# Patient Record
Sex: MALE | Race: WHITE | NOT HISPANIC OR LATINO | Employment: UNEMPLOYED | URBAN - METROPOLITAN AREA
[De-identification: names, ages, dates, MRNs, and addresses within clinical notes are randomized per-mention and may not be internally consistent; named-entity substitution may affect disease eponyms.]

---

## 2022-04-26 ENCOUNTER — OFFICE VISIT (OUTPATIENT)
Dept: FAMILY MEDICINE CLINIC | Facility: CLINIC | Age: 52
End: 2022-04-26

## 2022-04-26 VITALS
SYSTOLIC BLOOD PRESSURE: 154 MMHG | HEART RATE: 76 BPM | HEIGHT: 74 IN | WEIGHT: 233 LBS | DIASTOLIC BLOOD PRESSURE: 90 MMHG | TEMPERATURE: 97.1 F | RESPIRATION RATE: 14 BRPM | OXYGEN SATURATION: 97 % | BODY MASS INDEX: 29.9 KG/M2

## 2022-04-26 DIAGNOSIS — I25.118 CORONARY ARTERY DISEASE OF NATIVE ARTERY OF NATIVE HEART WITH STABLE ANGINA PECTORIS (HCC): ICD-10-CM

## 2022-04-26 DIAGNOSIS — Z11.59 ENCOUNTER FOR HEPATITIS C SCREENING TEST FOR LOW RISK PATIENT: Primary | ICD-10-CM

## 2022-04-26 DIAGNOSIS — E11.69 HYPERLIPIDEMIA ASSOCIATED WITH TYPE 2 DIABETES MELLITUS (HCC): ICD-10-CM

## 2022-04-26 DIAGNOSIS — K21.9 GASTROESOPHAGEAL REFLUX DISEASE WITHOUT ESOPHAGITIS: ICD-10-CM

## 2022-04-26 DIAGNOSIS — Z12.11 SCREEN FOR COLON CANCER: ICD-10-CM

## 2022-04-26 DIAGNOSIS — F17.210 CIGARETTE SMOKER: ICD-10-CM

## 2022-04-26 DIAGNOSIS — J43.9 PULMONARY EMPHYSEMA, UNSPECIFIED EMPHYSEMA TYPE (HCC): ICD-10-CM

## 2022-04-26 DIAGNOSIS — R07.89 OTHER CHEST PAIN: ICD-10-CM

## 2022-04-26 DIAGNOSIS — R06.02 SOB (SHORTNESS OF BREATH): ICD-10-CM

## 2022-04-26 DIAGNOSIS — E04.1 THYROID NODULE: ICD-10-CM

## 2022-04-26 DIAGNOSIS — E78.5 HYPERLIPIDEMIA ASSOCIATED WITH TYPE 2 DIABETES MELLITUS (HCC): ICD-10-CM

## 2022-04-26 DIAGNOSIS — R73.09 ELEVATED HEMOGLOBIN A1C: ICD-10-CM

## 2022-04-26 DIAGNOSIS — I10 PRIMARY HYPERTENSION: ICD-10-CM

## 2022-04-26 DIAGNOSIS — Z11.4 SCREENING FOR HIV (HUMAN IMMUNODEFICIENCY VIRUS): ICD-10-CM

## 2022-04-26 PROCEDURE — 99203 OFFICE O/P NEW LOW 30 MIN: CPT | Performed by: INTERNAL MEDICINE

## 2022-04-26 PROCEDURE — 93000 ELECTROCARDIOGRAM COMPLETE: CPT | Performed by: INTERNAL MEDICINE

## 2022-04-26 RX ORDER — AMLODIPINE BESYLATE 10 MG/1
10 TABLET ORAL DAILY
Qty: 90 TABLET | Refills: 3 | Status: SHIPPED | OUTPATIENT
Start: 2022-04-26

## 2022-04-26 RX ORDER — CLOPIDOGREL BISULFATE 75 MG/1
75 TABLET ORAL DAILY
Qty: 90 TABLET | Refills: 3 | Status: SHIPPED | OUTPATIENT
Start: 2022-04-26

## 2022-04-26 RX ORDER — CLOPIDOGREL BISULFATE 75 MG/1
75 TABLET ORAL DAILY
COMMUNITY
End: 2022-04-26 | Stop reason: SDUPTHER

## 2022-04-26 RX ORDER — VALSARTAN 160 MG/1
160 TABLET ORAL DAILY
Qty: 90 TABLET | Refills: 3 | Status: SHIPPED | OUTPATIENT
Start: 2022-04-26

## 2022-04-26 RX ORDER — AMLODIPINE BESYLATE AND ATORVASTATIN CALCIUM 10; 40 MG/1; MG/1
1 TABLET, FILM COATED ORAL DAILY
COMMUNITY
End: 2022-04-26

## 2022-04-26 RX ORDER — PANTOPRAZOLE SODIUM 40 MG/1
40 TABLET, DELAYED RELEASE ORAL DAILY
Qty: 30 TABLET | Refills: 3 | Status: SHIPPED | OUTPATIENT
Start: 2022-04-26

## 2022-04-26 RX ORDER — HYDROCHLOROTHIAZIDE 12.5 MG/1
12.5 TABLET ORAL DAILY
Qty: 90 TABLET | Refills: 3 | Status: SHIPPED | OUTPATIENT
Start: 2022-04-26

## 2022-04-26 RX ORDER — ASPIRIN 81 MG/1
81 TABLET ORAL DAILY
COMMUNITY
End: 2022-04-26

## 2022-04-26 RX ORDER — NEBIVOLOL 5 MG/1
5 TABLET ORAL DAILY
Qty: 90 TABLET | Refills: 3 | Status: SHIPPED | OUTPATIENT
Start: 2022-04-26

## 2022-04-26 RX ORDER — ATORVASTATIN CALCIUM 20 MG/1
20 TABLET, FILM COATED ORAL DAILY
Qty: 90 TABLET | Refills: 3 | Status: SHIPPED | OUTPATIENT
Start: 2022-04-26

## 2022-04-26 NOTE — LETTER
April 26, 2022     Patient: Xiomara Cantu  YOB: 1970  Date of Visit: 4/26/2022      To Whom it May Concern:    Xiomara Cantu is under my professional care  Radha Mccord was seen in my office on 4/26/2022  Alena needs medical insurance as soon as possible  This patient has COPD, Hypertension, CAD with two stents, Shortness of Breath, and Ulcer Disease  It is pertinent that this patient gets medical assistance and insurance at this time  If you have any questions or concerns, please don't hesitate to call           Sincerely,          Domenick Blizzard, MD        CC: No Recipients

## 2022-04-26 NOTE — PROGRESS NOTES
Assessment/Plan:         Diagnoses and all orders for this visit:    Encounter for hepatitis C screening test for low risk patient  -     Hepatitis C antibody; Future    Screening for HIV (human immunodeficiency virus)  -     HIV 1/2 Antigen/Antibody (4th Generation) w Reflex [facility]; Future    Screen for colon cancer  -     Occult Blood, Fecal Immunochemical; Future    Coronary artery disease of native artery of native heart with stable angina pectoris (HCC);S/P Stents Twice,;  -     nebivolol (BYSTOLIC) 5 mg tablet; Take 1 tablet (5 mg total) by mouth daily  -     amLODIPine (NORVASC) 10 mg tablet; Take 1 tablet (10 mg total) by mouth daily  -     valsartan (DIOVAN) 160 mg tablet; Take 1 tablet (160 mg total) by mouth daily  -     hydrochlorothiazide (HYDRODIURIL) 12 5 mg tablet; Take 1 tablet (12 5 mg total) by mouth daily  -     clopidogrel (PLAVIX) 75 mg tablet; Take 1 tablet (75 mg total) by mouth daily  -     Vitamin D 25 hydroxy; Future  -     Vitamin B12; Future  -     Stress test only, exercise; Future  -     CT chest wo contrast; Future  RTC in 1 mo w Blood work    Gastroesophageal reflux disease without esophagitis  -     pantoprazole (PROTONIX) 40 mg tablet; Take 1 tablet (40 mg total) by mouth daily  -     Vitamin D 25 hydroxy; Future  -     Vitamin B12; Future  -     H  pylori antigen, stool; Future    Primary hypertension  -     nebivolol (BYSTOLIC) 5 mg tablet; Take 1 tablet (5 mg total) by mouth daily  -     amLODIPine (NORVASC) 10 mg tablet; Take 1 tablet (10 mg total) by mouth daily  -     valsartan (DIOVAN) 160 mg tablet; Take 1 tablet (160 mg total) by mouth daily  -     hydrochlorothiazide (HYDRODIURIL) 12 5 mg tablet; Take 1 tablet (12 5 mg total) by mouth daily  -     Vitamin D 25 hydroxy; Future  -     Vitamin B12; Future  -     Stress test only, exercise;  Future  -     CT chest wo contrast; Future    Hyperlipidemia associated with type 2 diabetes mellitus (HCC)  -     atorvastatin (LIPITOR) 20 mg tablet; Take 1 tablet (20 mg total) by mouth daily  -     Lipid panel; Future  -     Vitamin D 25 hydroxy; Future  -     Vitamin B12; Future  -     Echo complete w/ contrast if indicated; Future  -     Stress test only, exercise; Future  -     CT chest wo contrast; Future    Cigarette smoker  -     Vitamin D 25 hydroxy; Future  -     Vitamin B12; Future  -     Echo complete w/ contrast if indicated; Future  -     Stress test only, exercise; Future  -     CT chest wo contrast; Future  -     Complete PFT with post bronchodilator; Future    Pulmonary emphysema, unspecified emphysema type ([address] )  -     Echo complete w/ contrast if indicated; Future  -     Complete PFT with post bronchodilator; Future    SOB (shortness of breath)  -     Echo complete w/ contrast if indicated; Future  -     Stress test only, exercise; Future  -     CT chest wo contrast; Future  -     POCT ECG    Other chest pain  -     Echo complete w/ contrast if indicated; Future  -     POCT ECG    Elevated hemoglobin A1c  -     UA (URINE) with reflex to Scope; Future  -     Hemoglobin A1C; Future  -     Comprehensive metabolic panel; Future  -     CBC and differential; Future  -     Magnesium; Future  -     Vitamin D 25 hydroxy; Future  -     Vitamin B12; Future  -     Echo complete w/ contrast if indicated; Future    Thyroid nodule  -     TSH, 3rd generation; Future  -     T4, free; Future  -     Thyroid Antibodies Panel; Future  -     US thyroid; Future  -     Vitamin D 25 hydroxy; Future  -     Vitamin B12; Future    Other orders  -     Discontinue: aspirin (ECOTRIN LOW STRENGTH) 81 mg EC tablet; Take 81 mg by mouth daily  -     Discontinue: rivaroxaban (XARELTO) 15 mg tablet; Take 15 mg by mouth  -     Discontinue: clopidogrel (PLAVIX) 75 mg tablet; Take 75 mg by mouth daily  -     Discontinue: amLODIPine-atorvastatin (CADUET) 10-40 MG per tablet; Take 1 tablet by mouth daily        Subjective:      Patient ID: Dari Oneal is a 49 y o  male     49 Y O Man is here for first time , H/P Discussed w Pt in detail, Lately he has increasing exertional SOB, still smokes,  The following portions of the patient's history were reviewed and updated as appropriate: allergies, current medications, past family history, past medical history, past social history, past surgical history and problem list     Review of Systems   Constitutional: Negative for chills, fatigue and fever  HENT: Negative for congestion, facial swelling, sore throat, trouble swallowing and voice change  Eyes: Negative for pain, discharge and visual disturbance  Respiratory: Positive for shortness of breath and wheezing  Negative for cough  Cardiovascular: Positive for chest pain  Negative for palpitations and leg swelling  Gastrointestinal: Negative for abdominal pain, blood in stool, constipation, diarrhea and nausea  Endocrine: Negative for polydipsia, polyphagia and polyuria  Genitourinary: Negative for difficulty urinating, hematuria and urgency  Musculoskeletal: Negative for arthralgias and myalgias  Skin: Negative for rash  Neurological: Negative for dizziness, tremors, weakness and headaches  Hematological: Negative for adenopathy  Does not bruise/bleed easily  Psychiatric/Behavioral: Negative for dysphoric mood, sleep disturbance and suicidal ideas  Objective:      /90 (BP Location: Left arm, Patient Position: Sitting, Cuff Size: Standard)   Pulse 76   Temp (!) 97 1 °F (36 2 °C) (Tympanic)   Resp 14   Ht 6' 2" (1 88 m)   Wt 106 kg (233 lb)   SpO2 97%   BMI 29 92 kg/m²          Physical Exam  Constitutional:       General: He is not in acute distress  HENT:      Head: Normocephalic  Mouth/Throat:      Pharynx: No oropharyngeal exudate  Eyes:      General: No scleral icterus  Conjunctiva/sclera: Conjunctivae normal       Pupils: Pupils are equal, round, and reactive to light  Neck:      Thyroid: No thyromegaly  Cardiovascular:      Rate and Rhythm: Normal rate and regular rhythm  Heart sounds: Murmur heard  Pulmonary:      Effort: Pulmonary effort is normal  No respiratory distress  Breath sounds: Wheezing present  No rales  Abdominal:      General: Bowel sounds are normal  There is no distension  Palpations: Abdomen is soft  Tenderness: There is no abdominal tenderness  There is no guarding or rebound  Musculoskeletal:         General: No tenderness  Cervical back: Neck supple  Lymphadenopathy:      Cervical: No cervical adenopathy  Skin:     Coloration: Skin is not pale  Findings: No rash  Neurological:      Mental Status: He is alert and oriented to person, place, and time  Sensory: No sensory deficit  Motor: No weakness

## 2022-04-26 NOTE — PROGRESS NOTES
BMI Counseling: Body mass index is 29 92 kg/m²  The BMI is above normal  Nutrition recommendations include reducing portion sizes

## 2024-05-08 ENCOUNTER — OFFICE VISIT (OUTPATIENT)
Dept: FAMILY MEDICINE CLINIC | Facility: CLINIC | Age: 54
End: 2024-05-08

## 2024-05-08 VITALS
RESPIRATION RATE: 14 BRPM | WEIGHT: 250 LBS | HEART RATE: 78 BPM | DIASTOLIC BLOOD PRESSURE: 80 MMHG | TEMPERATURE: 97.6 F | SYSTOLIC BLOOD PRESSURE: 130 MMHG | HEIGHT: 73 IN | BODY MASS INDEX: 33.13 KG/M2 | OXYGEN SATURATION: 99 %

## 2024-05-08 DIAGNOSIS — E78.5 HYPERLIPIDEMIA ASSOCIATED WITH TYPE 2 DIABETES MELLITUS  (HCC): ICD-10-CM

## 2024-05-08 DIAGNOSIS — E04.1 THYROID NODULE: ICD-10-CM

## 2024-05-08 DIAGNOSIS — I25.118 CORONARY ARTERY DISEASE OF NATIVE ARTERY OF NATIVE HEART WITH STABLE ANGINA PECTORIS (HCC): ICD-10-CM

## 2024-05-08 DIAGNOSIS — I10 PRIMARY HYPERTENSION: ICD-10-CM

## 2024-05-08 DIAGNOSIS — M79.672 CHRONIC HEEL PAIN, LEFT: ICD-10-CM

## 2024-05-08 DIAGNOSIS — G89.29 CHRONIC HEEL PAIN, LEFT: ICD-10-CM

## 2024-05-08 DIAGNOSIS — Z00.01 ENCOUNTER FOR GENERAL ADULT MEDICAL EXAMINATION WITH ABNORMAL FINDINGS: Primary | ICD-10-CM

## 2024-05-08 DIAGNOSIS — Z12.11 SCREEN FOR COLON CANCER: ICD-10-CM

## 2024-05-08 DIAGNOSIS — E11.69 HYPERLIPIDEMIA ASSOCIATED WITH TYPE 2 DIABETES MELLITUS  (HCC): ICD-10-CM

## 2024-05-08 DIAGNOSIS — K21.9 GASTROESOPHAGEAL REFLUX DISEASE WITHOUT ESOPHAGITIS: ICD-10-CM

## 2024-05-08 DIAGNOSIS — R06.02 SOB (SHORTNESS OF BREATH): ICD-10-CM

## 2024-05-08 LAB — SL AMB POCT HEMOGLOBIN AIC: 5.8 (ref ?–6.5)

## 2024-05-08 PROCEDURE — 99214 OFFICE O/P EST MOD 30 MIN: CPT | Performed by: INTERNAL MEDICINE

## 2024-05-08 PROCEDURE — 93000 ELECTROCARDIOGRAM COMPLETE: CPT | Performed by: INTERNAL MEDICINE

## 2024-05-08 PROCEDURE — 83036 HEMOGLOBIN GLYCOSYLATED A1C: CPT | Performed by: INTERNAL MEDICINE

## 2024-05-08 PROCEDURE — 99396 PREV VISIT EST AGE 40-64: CPT | Performed by: INTERNAL MEDICINE

## 2024-05-08 RX ORDER — AMLODIPINE BESYLATE 10 MG/1
10 TABLET ORAL DAILY
Qty: 90 TABLET | Refills: 3 | Status: SHIPPED | OUTPATIENT
Start: 2024-05-08

## 2024-05-08 RX ORDER — ATORVASTATIN CALCIUM 20 MG/1
20 TABLET, FILM COATED ORAL DAILY
Qty: 90 TABLET | Refills: 3 | Status: SHIPPED | OUTPATIENT
Start: 2024-05-08

## 2024-05-08 RX ORDER — CLOPIDOGREL BISULFATE 75 MG/1
75 TABLET ORAL DAILY
Qty: 90 TABLET | Refills: 3 | Status: SHIPPED | OUTPATIENT
Start: 2024-05-08

## 2024-05-08 RX ORDER — HYDROCHLOROTHIAZIDE 12.5 MG/1
12.5 TABLET ORAL DAILY
Qty: 90 TABLET | Refills: 3 | Status: SHIPPED | OUTPATIENT
Start: 2024-05-08

## 2024-05-08 RX ORDER — VALSARTAN 160 MG/1
160 TABLET ORAL DAILY
Qty: 90 TABLET | Refills: 3 | Status: SHIPPED | OUTPATIENT
Start: 2024-05-08

## 2024-05-08 RX ORDER — NEBIVOLOL 5 MG/1
5 TABLET ORAL DAILY
Qty: 90 TABLET | Refills: 3 | Status: SHIPPED | OUTPATIENT
Start: 2024-05-08

## 2024-05-08 NOTE — LETTER
May 8, 2024     Patient: Alena Kulkarni  YOB: 1970  Date of Visit: 5/8/2024      To Whom it May Concern:    Alena Kulkarni is under my professional care. Alena was seen in my office on 5/8/2024. Alena is in need of medical insurance as soon as possible due to having multiple medical conditions.     If you have any questions or concerns, please don't hesitate to call.         Sincerely,          Jayy Miranda MD        CC: No Recipients

## 2024-05-08 NOTE — PROGRESS NOTES
Name: Alena Kulkarni      : 1970      MRN: [id number]  Encounter Provider: Jayy Miranda MD  Encounter Date: 2024   Encounter department: [facility]    Assessment & Plan     1. Encounter for general adult medical examination with abnormal findings  Comments:  done in Detail  life style Mod  stop smoking  RTC in 1-2 mos w Blood work  Orders:  -     UA (URINE) with reflex to Scope; Future  -     PSA Total, Diagnostic; Future  -     Magnesium; Future  -     Vitamin B12; Future  -     Vitamin D 25 hydroxy; Future; Expected date: 2024  -     CBC and differential; Future; Expected date: 05/15/2024  -     Lipid panel; Future; Expected date: 05/15/2024  -     Comprehensive metabolic panel; Future; Expected date: 2024    2. Coronary artery disease of native artery of native heart with stable angina pectoris (HCC)  Comments:  stable  continue same  stop smoking  RTC in 1-2 mos w Exercise stress test  Orders:  -     amLODIPine (NORVASC) 10 mg tablet; Take 1 tablet (10 mg total) by mouth daily  -     clopidogrel (PLAVIX) 75 mg tablet; Take 1 tablet (75 mg total) by mouth daily  -     hydroCHLOROthiazide 12.5 mg tablet; Take 1 tablet (12.5 mg total) by mouth daily  -     nebivolol (BYSTOLIC) 5 mg tablet; Take 1 tablet (5 mg total) by mouth daily  -     valsartan (DIOVAN) 160 mg tablet; Take 1 tablet (160 mg total) by mouth daily  -     POCT hemoglobin A1c  -     POCT ECG  -     H. pylori antigen, stool; Future  -     Cologuard  -     XR foot 3+ vw left; Future; Expected date: 2024  -     CT chest wo contrast; Future; Expected date: 2024  -     Echo complete w/ contrast if indicated; Future; Expected date: 2024  -     Stress test only, exercise; Future; Expected date: 2024  -     PSA Total, Diagnostic; Future    3. Primary hypertension  -     amLODIPine (NORVASC) 10 mg tablet; Take 1 tablet (10 mg total) by mouth daily  -     hydroCHLOROthiazide  12.5 mg tablet; Take 1 tablet (12.5 mg total) by mouth daily  -     nebivolol (BYSTOLIC) 5 mg tablet; Take 1 tablet (5 mg total) by mouth daily  -     valsartan (DIOVAN) 160 mg tablet; Take 1 tablet (160 mg total) by mouth daily  -     POCT hemoglobin A1c  -     POCT ECG  -     PSA Total, Diagnostic; Future    4. Hyperlipidemia associated with type 2 diabetes mellitus  (HCC)  -     atorvastatin (LIPITOR) 20 mg tablet; Take 1 tablet (20 mg total) by mouth daily  -     POCT hemoglobin A1c  -     POCT ECG  -     PSA Total, Diagnostic; Future    5. Chronic heel pain, left  Comments:  moist Heat  home P .T.  Consider; podiatry consult  Orders:  -     Diclofenac Sodium (VOLTAREN) 1 %; Apply 2 g topically 4 (four) times a day  -     XR foot 3+ vw left; Future; Expected date: 05/08/2024    6. Gastroesophageal reflux disease without esophagitis  -     H. pylori antigen, stool; Future    7. Screen for colon cancer  -     Cologuard    8. SOB (shortness of breath)  -     CT chest wo contrast; Future; Expected date: 05/08/2024  -     Echo complete w/ contrast if indicated; Future; Expected date: 05/08/2024  -     Stress test only, exercise; Future; Expected date: 05/08/2024    9. Thyroid nodule  -     TSH, 3rd generation; Future; Expected date: 05/08/2024  -     T4, free; Future; Expected date: 05/08/2024  -     US thyroid; Future; Expected date: 05/08/2024  -     Thyroid Antibodies Panel; Future    Annual Physical Exam : done in detail  Life style Mod  RTC in 1-2 mos w Blood work       Subjective      53 Y O man is here for Annual Physical Exam and Regular check Up, he still smokes, he has few symptoms, no recent Blood work, med list reviewed,..      Review of Systems   Constitutional:  Negative for chills, fatigue and fever.   HENT:  Negative for congestion, facial swelling, sore throat, trouble swallowing and voice change.    Eyes:  Negative for pain, discharge and visual disturbance.   Respiratory:  Negative for cough,  "shortness of breath and wheezing.    Cardiovascular:  Negative for chest pain, palpitations and leg swelling.   Gastrointestinal:  Negative for abdominal pain, blood in stool, constipation, diarrhea and nausea.   Endocrine: Negative for polydipsia, polyphagia and polyuria.   Genitourinary:  Negative for difficulty urinating, hematuria and urgency.   Musculoskeletal:  Positive for arthralgias and gait problem. Negative for myalgias.   Skin:  Negative for rash.   Neurological:  Negative for dizziness, tremors, weakness and headaches.   Hematological:  Negative for adenopathy. Does not bruise/bleed easily.   Psychiatric/Behavioral:  Negative for dysphoric mood, sleep disturbance and suicidal ideas.        Current Outpatient Medications on File Prior to Visit   Medication Sig    [DISCONTINUED] amLODIPine (NORVASC) 10 mg tablet Take 1 tablet (10 mg total) by mouth daily    [DISCONTINUED] atorvastatin (LIPITOR) 20 mg tablet Take 1 tablet (20 mg total) by mouth daily    [DISCONTINUED] clopidogrel (PLAVIX) 75 mg tablet Take 1 tablet (75 mg total) by mouth daily    [DISCONTINUED] hydrochlorothiazide (HYDRODIURIL) 12.5 mg tablet Take 1 tablet (12.5 mg total) by mouth daily    [DISCONTINUED] nebivolol (BYSTOLIC) 5 mg tablet Take 1 tablet (5 mg total) by mouth daily    [DISCONTINUED] pantoprazole (PROTONIX) 40 mg tablet Take 1 tablet (40 mg total) by mouth daily    [DISCONTINUED] valsartan (DIOVAN) 160 mg tablet Take 1 tablet (160 mg total) by mouth daily       Objective     /80 (BP Location: Left arm, Patient Position: Sitting, Cuff Size: Standard)   Pulse 78   Temp 97.6 °F (36.4 °C) (Tympanic)   Resp 14   Ht 6' 1\" (1.854 m)   Wt 113 kg (250 lb)   SpO2 99%   BMI 32.98 kg/m²     Physical Exam  Constitutional:       General: He is not in acute distress.  HENT:      Head: Normocephalic.      Mouth/Throat:      Pharynx: No oropharyngeal exudate.   Eyes:      General: No scleral icterus.     Conjunctiva/sclera: " Conjunctivae normal.      Pupils: Pupils are equal, round, and reactive to light.   Neck:      Thyroid: No thyromegaly.   Cardiovascular:      Rate and Rhythm: Normal rate and regular rhythm.      Heart sounds: Murmur heard.   Pulmonary:      Effort: Pulmonary effort is normal. No respiratory distress.      Breath sounds: Wheezing present. No rales.   Abdominal:      General: Bowel sounds are normal. There is no distension.      Palpations: Abdomen is soft.      Tenderness: There is no abdominal tenderness. There is no guarding or rebound.   Musculoskeletal:         General: Tenderness present.      Cervical back: Neck supple.      Comments: Left heel area   Lymphadenopathy:      Cervical: No cervical adenopathy.   Skin:     Coloration: Skin is not pale.      Findings: No rash.   Neurological:      Mental Status: He is alert and oriented to person, place, and time.      Sensory: No sensory deficit.      Motor: No weakness.       Jayy Miranda MD

## 2024-09-11 ENCOUNTER — APPOINTMENT (EMERGENCY)
Dept: CT IMAGING | Facility: HOSPITAL | Age: 54
End: 2024-09-11

## 2024-09-11 ENCOUNTER — HOSPITAL ENCOUNTER (EMERGENCY)
Facility: HOSPITAL | Age: 54
Discharge: HOME | End: 2024-09-12
Attending: EMERGENCY MEDICINE

## 2024-09-11 DIAGNOSIS — R91.1 LUNG NODULE: ICD-10-CM

## 2024-09-11 DIAGNOSIS — E04.1 THYROID NODULE: ICD-10-CM

## 2024-09-11 DIAGNOSIS — Z63.5 PROBLEMS ASSOCIATED WITH DIVORCE: ICD-10-CM

## 2024-09-11 DIAGNOSIS — F39 MOOD DISORDER (HCC): ICD-10-CM

## 2024-09-11 DIAGNOSIS — R07.9 CHEST PAIN: ICD-10-CM

## 2024-09-11 DIAGNOSIS — R45.851 SUICIDAL IDEATIONS: Primary | ICD-10-CM

## 2024-09-11 DIAGNOSIS — R93.2 ABNORMAL LIVER CT: ICD-10-CM

## 2024-09-11 LAB
2HR DELTA HS TROPONIN: 1 NG/L
ALBUMIN SERPL BCG-MCNC: 4.9 G/DL (ref 3.5–5)
ALP SERPL-CCNC: 85 U/L (ref 34–104)
ALT SERPL W P-5'-P-CCNC: 22 U/L (ref 7–52)
AMPHETAMINES SERPL QL SCN: NEGATIVE
ANION GAP SERPL CALCULATED.3IONS-SCNC: 10 MMOL/L (ref 4–13)
AST SERPL W P-5'-P-CCNC: 17 U/L (ref 13–39)
ATRIAL RATE: 85 BPM
BARBITURATES UR QL: NEGATIVE
BASOPHILS # BLD AUTO: 0.06 THOUSANDS/ΜL (ref 0–0.1)
BASOPHILS NFR BLD AUTO: 1 % (ref 0–1)
BENZODIAZ UR QL: NEGATIVE
BILIRUB SERPL-MCNC: 0.4 MG/DL (ref 0.2–1)
BNP SERPL-MCNC: 34 PG/ML (ref 0–100)
BUN SERPL-MCNC: 6 MG/DL (ref 5–25)
CALCIUM SERPL-MCNC: 9.9 MG/DL (ref 8.4–10.2)
CARDIAC TROPONIN I PNL SERPL HS: 4 NG/L
CARDIAC TROPONIN I PNL SERPL HS: 5 NG/L
CHLORIDE SERPL-SCNC: 98 MMOL/L (ref 96–108)
CO2 SERPL-SCNC: 28 MMOL/L (ref 21–32)
COCAINE UR QL: NEGATIVE
CREAT SERPL-MCNC: 0.98 MG/DL (ref 0.6–1.3)
EOSINOPHIL # BLD AUTO: 0.12 THOUSAND/ΜL (ref 0–0.61)
EOSINOPHIL NFR BLD AUTO: 1 % (ref 0–6)
ERYTHROCYTE [DISTWIDTH] IN BLOOD BY AUTOMATED COUNT: 14.2 % (ref 11.6–15.1)
ETHANOL EXG-MCNC: 0 MG/DL
ETHANOL SERPL-MCNC: <10 MG/DL
FENTANYL UR QL SCN: NEGATIVE
GFR SERPL CREATININE-BSD FRML MDRD: 87 ML/MIN/1.73SQ M
GLUCOSE SERPL-MCNC: 116 MG/DL (ref 65–140)
HCT VFR BLD AUTO: 44.9 % (ref 36.5–49.3)
HGB BLD-MCNC: 15.3 G/DL (ref 12–17)
HYDROCODONE UR QL SCN: NEGATIVE
IMM GRANULOCYTES # BLD AUTO: 0.05 THOUSAND/UL (ref 0–0.2)
IMM GRANULOCYTES NFR BLD AUTO: 1 % (ref 0–2)
LYMPHOCYTES # BLD AUTO: 2.78 THOUSANDS/ΜL (ref 0.6–4.47)
LYMPHOCYTES NFR BLD AUTO: 28 % (ref 14–44)
MCH RBC QN AUTO: 28.5 PG (ref 26.8–34.3)
MCHC RBC AUTO-ENTMCNC: 34.1 G/DL (ref 31.4–37.4)
MCV RBC AUTO: 84 FL (ref 82–98)
METHADONE UR QL: NEGATIVE
MONOCYTES # BLD AUTO: 0.5 THOUSAND/ΜL (ref 0.17–1.22)
MONOCYTES NFR BLD AUTO: 5 % (ref 4–12)
NEUTROPHILS # BLD AUTO: 6.39 THOUSANDS/ΜL (ref 1.85–7.62)
NEUTS SEG NFR BLD AUTO: 64 % (ref 43–75)
NRBC BLD AUTO-RTO: 0 /100 WBCS
OPIATES UR QL SCN: NEGATIVE
OXYCODONE+OXYMORPHONE UR QL SCN: NEGATIVE
P AXIS: 51 DEGREES
PCP UR QL: NEGATIVE
PLATELET # BLD AUTO: 328 THOUSANDS/UL (ref 149–390)
PMV BLD AUTO: 9.8 FL (ref 8.9–12.7)
POTASSIUM SERPL-SCNC: 3 MMOL/L (ref 3.5–5.3)
PR INTERVAL: 180 MS
PROT SERPL-MCNC: 8 G/DL (ref 6.4–8.4)
QRS AXIS: -23 DEGREES
QRSD INTERVAL: 118 MS
QT INTERVAL: 382 MS
QTC INTERVAL: 454 MS
RBC # BLD AUTO: 5.36 MILLION/UL (ref 3.88–5.62)
SODIUM SERPL-SCNC: 136 MMOL/L (ref 135–147)
T WAVE AXIS: -6 DEGREES
THC UR QL: NEGATIVE
TSH SERPL DL<=0.05 MIU/L-ACNC: 0.84 UIU/ML (ref 0.45–4.5)
VENTRICULAR RATE: 85 BPM
WBC # BLD AUTO: 9.9 THOUSAND/UL (ref 4.31–10.16)

## 2024-09-11 PROCEDURE — 82077 ASSAY SPEC XCP UR&BREATH IA: CPT | Performed by: PHYSICIAN ASSISTANT

## 2024-09-11 PROCEDURE — 82075 ASSAY OF BREATH ETHANOL: CPT | Performed by: PHYSICIAN ASSISTANT

## 2024-09-11 PROCEDURE — 99285 EMERGENCY DEPT VISIT HI MDM: CPT | Performed by: PHYSICIAN ASSISTANT

## 2024-09-11 PROCEDURE — 80307 DRUG TEST PRSMV CHEM ANLYZR: CPT | Performed by: PHYSICIAN ASSISTANT

## 2024-09-11 PROCEDURE — 93010 ELECTROCARDIOGRAM REPORT: CPT | Performed by: INTERNAL MEDICINE

## 2024-09-11 PROCEDURE — 96374 THER/PROPH/DIAG INJ IV PUSH: CPT

## 2024-09-11 PROCEDURE — 71275 CT ANGIOGRAPHY CHEST: CPT

## 2024-09-11 PROCEDURE — 93005 ELECTROCARDIOGRAM TRACING: CPT

## 2024-09-11 PROCEDURE — 83880 ASSAY OF NATRIURETIC PEPTIDE: CPT | Performed by: PHYSICIAN ASSISTANT

## 2024-09-11 PROCEDURE — 74174 CTA ABD&PLVS W/CONTRAST: CPT

## 2024-09-11 PROCEDURE — 85025 COMPLETE CBC W/AUTO DIFF WBC: CPT | Performed by: PHYSICIAN ASSISTANT

## 2024-09-11 PROCEDURE — 36415 COLL VENOUS BLD VENIPUNCTURE: CPT | Performed by: PHYSICIAN ASSISTANT

## 2024-09-11 PROCEDURE — 80053 COMPREHEN METABOLIC PANEL: CPT | Performed by: PHYSICIAN ASSISTANT

## 2024-09-11 PROCEDURE — 99285 EMERGENCY DEPT VISIT HI MDM: CPT

## 2024-09-11 PROCEDURE — 84443 ASSAY THYROID STIM HORMONE: CPT | Performed by: PHYSICIAN ASSISTANT

## 2024-09-11 PROCEDURE — 96375 TX/PRO/DX INJ NEW DRUG ADDON: CPT

## 2024-09-11 PROCEDURE — 84484 ASSAY OF TROPONIN QUANT: CPT | Performed by: PHYSICIAN ASSISTANT

## 2024-09-11 PROCEDURE — 96361 HYDRATE IV INFUSION ADD-ON: CPT

## 2024-09-11 RX ORDER — ALBUTEROL SULFATE 5 MG/ML
5 SOLUTION RESPIRATORY (INHALATION) ONCE
Status: DISCONTINUED | OUTPATIENT
Start: 2024-09-11 | End: 2024-09-11

## 2024-09-11 RX ORDER — LORAZEPAM 2 MG/ML
1 INJECTION INTRAMUSCULAR ONCE
Status: COMPLETED | OUTPATIENT
Start: 2024-09-11 | End: 2024-09-11

## 2024-09-11 RX ORDER — METHYLPREDNISOLONE SODIUM SUCCINATE 125 MG/2ML
125 INJECTION, POWDER, LYOPHILIZED, FOR SOLUTION INTRAMUSCULAR; INTRAVENOUS ONCE
Status: DISCONTINUED | OUTPATIENT
Start: 2024-09-11 | End: 2024-09-11

## 2024-09-11 RX ORDER — ONDANSETRON 2 MG/ML
4 INJECTION INTRAMUSCULAR; INTRAVENOUS ONCE
Status: COMPLETED | OUTPATIENT
Start: 2024-09-11 | End: 2024-09-11

## 2024-09-11 RX ADMIN — ONDANSETRON 4 MG: 2 INJECTION INTRAMUSCULAR; INTRAVENOUS at 14:02

## 2024-09-11 RX ADMIN — LORAZEPAM 1 MG: 2 INJECTION INTRAMUSCULAR; INTRAVENOUS at 16:01

## 2024-09-11 RX ADMIN — MORPHINE SULFATE 2 MG: 2 INJECTION, SOLUTION INTRAMUSCULAR; INTRAVENOUS at 14:03

## 2024-09-11 RX ADMIN — SODIUM CHLORIDE 1000 ML: 0.9 INJECTION, SOLUTION INTRAVENOUS at 14:06

## 2024-09-11 RX ADMIN — IOHEXOL 100 ML: 350 INJECTION, SOLUTION INTRAVENOUS at 15:39

## 2024-09-11 SDOH — SOCIAL STABILITY - SOCIAL INSECURITY: DISRUPTION OF FAMILY BY SEPARATION AND DIVORCE: Z63.5

## 2024-09-11 NOTE — ED NOTES
"The patient is a 55 y/o Albanian-speaking M from [place], accompanied by son, Tadeo Kulkarni, [phone]. He presented due to chest pain and was worked up for this with negative findings. His family reported he had also shared he had planned to jump out of a window to kill himself, but did not do so because he was concerned that it might not be effective in causing his death. Patient is a poor historian. He presents as extremely tired and provides sparse information. He makes no eye contact and seems withdrawn and anxious. Family had reported the patient is going through a divorce. Son stated that is not true, that the patient is blowing the issue out of proportion. He stated the extended family resides together. Patient and wife continue to reside together. According to the son, they have their share of marital conflicts. Patient's wife did not ask for a divorce and there is not a divorce pending. He stated his mother asked for some space due to their conflicts. According to patient's son, she has no intention of moving out, and, culturally, they do not typically opt for divorce. He stated his father has always gotten worked up about things quickly. Son stated his father has not been eating much at all and has not been sleeping \"for 5 days.\" He does not think he missed work, however. The patient denied homicidal ideation. He denied hallucinations of any kind.  There is no evidence of delusional thinking. The patient and his son were both opposed to coming into the hospital for treatment due to language barrier and preference for being home with family. Patient also stated he is currently uninsured and does not want to accrue debt.  In assessing the patient, there is concern that he may not be forthcoming with his feelings and that he does not identify factors that mitigate suicidality. Patient is agreeable to remaining overnight to be evaluated by Psychiatry in the morning.   "

## 2024-09-11 NOTE — ED PROVIDER NOTES
1. Suicidal ideations    2. Problems associated with divorce    3. Lung nodule    4. Abnormal liver CT    5. Thyroid nodule    6. Chest pain      ED Disposition       ED Disposition   Transfer to Behavioral Health Condition   --    Date/Time   Wed Sep 11, 2024  4:41 PM    Comment   Alena Kulkarni should be transferred out to crisis consultation and has been medically cleared.               Assessment & Plan       Medical Decision Making  54-year-old male presenting for evaluation of chest pain is under enlargement of stress recently chest pain does radiate to the back and is associated with intermittent tingling of the hands patient does have a history of heart attack and stents placed, differential diagnosis includes is not limited to ACS, dysrhythmia, anemia, metabolic disturbances, aortic dissection, congestive heart failure and others.  Patient is also having suicidal ideations in the setting of his recent divorce and had thoughts of jumping out of a window yesterday however reportedly according to cousin the patient reports that he did not do this because he felt that he would not be successful in suicide should he attempt and would only be left with injuries.  For this reason crisis consultation was placed, medical clearance initiated primarily.    Workup reveals incidental findings on CT scan which were placed in the disposition.  No cardiac abnormalities noted on blood work or imaging  EKG on my interpretation shows no acute ischemic changes or malignant dysrhythmia, evidence of previous inferior infarct is noted however no other abnormalities noted.    Due to patient's reported suicidality and concern for mental health patient will remain in the emergency department awaiting psychiatric consultation for determination of psychiatric hospitalization.  Patient signed out to colleague pending psychiatric consultation and disposition.    Amount and/or Complexity of Data Reviewed  Labs: ordered.  Radiology:  ordered.    Risk  Prescription drug management.  Decision regarding hospitalization.          HEART Risk Score      Flowsheet Row Most Recent Value   Heart Score Risk Calculator    History 2 Filed at: 09/11/2024 1633   ECG 0 Filed at: 09/11/2024 1633   Age 1 Filed at: 09/11/2024 1633   Risk Factors 2 Filed at: 09/11/2024 1633   Troponin 0 Filed at: 09/11/2024 1633   HEART Score 5 Filed at: 09/11/2024 1633               ED Course as of 09/11/24 2234   Wed Sep 11, 2024   1629      No aortic aneurysm, dissection or other acute pathology.     1.7 cm circumscribed nodule in the lingula. No adenopathy Based on current Fleischner Society 2017 Guidelines on incidental pulmonary nodule, either PET/CT scan evaluation, tissue sampling or short term interval followup non-contrast CT followup   (initially in 3 months) may be considered appropriate.     Ill-defined subcapsular foci of enhancement in segment 4 of liver may represent arterioportal shunting but suggest further evaluation with contrast-enhanced MRI.     Incidental thyroid nodule(s) for which nonemergent thyroid ultrasound is recommended.     1629 The absence of any blood work abnormalities or CTA evidence of acute pathology as cause for the patient's chest pain, patient is medically cleared for psychiatric consultation at this time       Medications   sodium chloride 0.9 % bolus 1,000 mL (0 mL Intravenous Stopped 9/11/24 1510)   ondansetron (ZOFRAN) injection 4 mg (4 mg Intravenous Given 9/11/24 1402)   morphine injection 2 mg (2 mg Intravenous Given 9/11/24 1403)   iohexol (OMNIPAQUE) 350 MG/ML injection (MULTI-DOSE) 100 mL (100 mL Intravenous Given 9/11/24 1539)   LORazepam (ATIVAN) injection 1 mg (1 mg Intravenous Given 9/11/24 1601)       History of Present Illness       54-year-old male presenting for evaluation of chest pain which does radiate to the back and is associated with upper extremity hand tingling.  He is accompanied by his cousin who is interpreting  for him and reports that his chest pain began yesterday and has been intermittent and ongoing since then with associated dizziness, headache, nausea, vomiting, dyspnea at times.  Cousin reports that he has had a heart attack in the past and has had stents placed and reports he cannot take aspirin due to a history of stomach ulcers.      Chest Pain  Associated symptoms: dizziness    Associated symptoms: no abdominal pain, no fatigue, no fever, no nausea, no numbness, no palpitations, no shortness of breath and not vomiting            Review of Systems   Constitutional:  Negative for chills, fatigue and fever.   HENT:  Negative for congestion, ear pain, rhinorrhea and sore throat.    Eyes:  Negative for redness.   Respiratory:  Negative for chest tightness and shortness of breath.    Cardiovascular:  Positive for chest pain. Negative for palpitations.   Gastrointestinal:  Negative for abdominal pain, nausea and vomiting.   Genitourinary:  Negative for dysuria and hematuria.   Musculoskeletal: Negative.    Skin:  Negative for rash.   Neurological:  Positive for dizziness. Negative for syncope, light-headedness and numbness.   Psychiatric/Behavioral:  Positive for suicidal ideas.            Objective     ED Triage Vitals   Temperature Pulse Blood Pressure Respirations SpO2 Patient Position - Orthostatic VS   09/11/24 1346 09/11/24 1346 09/11/24 1346 09/11/24 1346 09/11/24 1346 09/11/24 1346   98 °F (36.7 °C) 91 157/98 20 100 % Lying      Temp Source Heart Rate Source BP Location FiO2 (%) Pain Score    09/11/24 1346 09/11/24 1346 09/11/24 1346 -- 09/11/24 1403    Oral Monitor Left arm  5        Physical Exam  Vitals and nursing note reviewed.   Constitutional:       Appearance: Normal appearance. He is well-developed.      Comments: Withdrawn and tired appearing male in no acute distress.   HENT:      Head: Normocephalic and atraumatic.   Eyes:      General: No scleral icterus.     Pupils: Pupils are equal, round, and  reactive to light.   Cardiovascular:      Rate and Rhythm: Normal rate and regular rhythm.      Pulses: Normal pulses.   Pulmonary:      Effort: Pulmonary effort is normal. No respiratory distress.      Breath sounds: No stridor.   Abdominal:      General: There is no distension.      Palpations: There is no mass.   Musculoskeletal:      Cervical back: Normal range of motion.      Right lower leg: No edema.      Left lower leg: No edema.   Skin:     General: Skin is warm and dry.      Capillary Refill: Capillary refill takes less than 2 seconds.      Coloration: Skin is not jaundiced.   Neurological:      Mental Status: He is alert and oriented to person, place, and time.      Gait: Gait normal.   Psychiatric:         Mood and Affect: Mood normal.         Labs Reviewed   COMPREHENSIVE METABOLIC PANEL - Abnormal       Result Value    Sodium 136      Potassium 3.0 (*)     Chloride 98      CO2 28      ANION GAP 10      BUN 6      Creatinine 0.98      Glucose 116      Calcium 9.9      AST 17      ALT 22      Alkaline Phosphatase 85      Total Protein 8.0      Albumin 4.9      Total Bilirubin 0.40      eGFR 87      Narrative:     National Kidney Disease Foundation guidelines for Chronic Kidney Disease (CKD):     Stage 1 with normal or high GFR (GFR > 90 mL/min/1.73 square meters)    Stage 2 Mild CKD (GFR = 60-89 mL/min/1.73 square meters)    Stage 3A Moderate CKD (GFR = 45-59 mL/min/1.73 square meters)    Stage 3B Moderate CKD (GFR = 30-44 mL/min/1.73 square meters)    Stage 4 Severe CKD (GFR = 15-29 mL/min/1.73 square meters)    Stage 5 End Stage CKD (GFR <15 mL/min/1.73 square meters)  Note: GFR calculation is accurate only with a steady state creatinine   HS TROPONIN I 0HR - Normal    hs TnI 0hr 4     B-TYPE NATRIURETIC PEPTIDE (BNP) - Normal    BNP 34     HS TROPONIN I 2HR - Normal    hs TnI 2hr 5      Delta 2hr hsTnI 1     RAPID DRUG SCREEN, URINE - Normal    Amph/Meth UR Negative      Barbiturate Ur Negative       Benzodiazepine Urine Negative      Cocaine Urine Negative      Methadone Urine Negative      Opiate Urine Negative      PCP Ur Negative      THC Urine Negative      Oxycodone Urine Negative      Fentanyl Urine Negative      HYDROCODONE URINE Negative      Narrative:     FOR MEDICAL PURPOSES ONLY.   IF CONFIRMATION NEEDED PLEASE CONTACT THE LAB WITHIN 5 DAYS.    Drug Screen Cutoff Levels:  AMPHETAMINE/METHAMPHETAMINES  1000 ng/mL  BARBITURATES     200 ng/mL  BENZODIAZEPINES     200 ng/mL  COCAINE      300 ng/mL  METHADONE      300 ng/mL  OPIATES      300 ng/mL  PHENCYCLIDINE     25 ng/mL  THC       50 ng/mL  OXYCODONE      100 ng/mL  FENTANYL      5 ng/mL  HYDROCODONE     300 ng/mL   TSH, 3RD GENERATION - Normal    TSH 3RD GENERATON 0.838     MEDICAL ALCOHOL - Normal    Ethanol Lvl <10     POCT ALCOHOL BREATH TEST - Normal    EXTBreath Alcohol 0.00     CBC AND DIFFERENTIAL    WBC 9.90      RBC 5.36      Hemoglobin 15.3      Hematocrit 44.9      MCV 84      MCH 28.5      MCHC 34.1      RDW 14.2      MPV 9.8      Platelets 328      nRBC 0      Segmented % 64      Immature Grans % 1      Lymphocytes % 28      Monocytes % 5      Eosinophils Relative 1      Basophils Relative 1      Absolute Neutrophils 6.39      Absolute Immature Grans 0.05      Absolute Lymphocytes 2.78      Absolute Monocytes 0.50      Eosinophils Absolute 0.12      Basophils Absolute 0.06     HS TROPONIN I 4HR     CTA dissection protocol chest/abdomen/pelvis   Final Interpretation by E. Alec Schoenberger, MD (09/11 1622)      No aortic aneurysm, dissection or other acute pathology.      1.7 cm circumscribed nodule in the lingula. No adenopathy Based on current Fleischner Society 2017 Guidelines on incidental pulmonary nodule, either PET/CT scan evaluation, tissue sampling or short term interval followup non-contrast CT followup    (initially in 3 months) may be considered appropriate.      Ill-defined subcapsular foci of enhancement in segment 4 of  liver may represent arterioportal shunting but suggest further evaluation with contrast-enhanced MRI.      Incidental thyroid nodule(s) for which nonemergent thyroid ultrasound is recommended.      Other nonemergent incidental findings as above      Study marked in epic notification and follow-up.      Workstation performed: [id number]             Procedures       Ave Iraheta PA-C  09/11/24 [id number]

## 2024-09-12 VITALS
HEART RATE: 84 BPM | DIASTOLIC BLOOD PRESSURE: 93 MMHG | TEMPERATURE: 98 F | WEIGHT: 257.06 LBS | BODY MASS INDEX: 33.91 KG/M2 | SYSTOLIC BLOOD PRESSURE: 141 MMHG | OXYGEN SATURATION: 99 % | RESPIRATION RATE: 18 BRPM

## 2024-09-12 PROBLEM — F39 MOOD DISORDER (HCC): Status: ACTIVE | Noted: 2024-09-12

## 2024-09-12 LAB
ATRIAL RATE: 84 BPM
P AXIS: 42 DEGREES
PR INTERVAL: 194 MS
QRS AXIS: -14 DEGREES
QRSD INTERVAL: 118 MS
QT INTERVAL: 404 MS
QTC INTERVAL: 477 MS
T WAVE AXIS: -4 DEGREES
VENTRICULAR RATE: 84 BPM

## 2024-09-12 PROCEDURE — 99244 OFF/OP CNSLTJ NEW/EST MOD 40: CPT | Performed by: PSYCHIATRY & NEUROLOGY

## 2024-09-12 PROCEDURE — 93010 ELECTROCARDIOGRAM REPORT: CPT | Performed by: STUDENT IN AN ORGANIZED HEALTH CARE EDUCATION/TRAINING PROGRAM

## 2024-09-12 RX ORDER — MIRTAZAPINE 7.5 MG/1
7.5 TABLET, FILM COATED ORAL
Qty: 30 TABLET | Refills: 0 | Status: SHIPPED | OUTPATIENT
Start: 2024-09-12

## 2024-09-12 RX ORDER — ACETAMINOPHEN 325 MG/1
975 TABLET ORAL ONCE
Status: COMPLETED | OUTPATIENT
Start: 2024-09-12 | End: 2024-09-12

## 2024-09-12 RX ORDER — MIRTAZAPINE 15 MG/1
7.5 TABLET, FILM COATED ORAL
Status: DISCONTINUED | OUTPATIENT
Start: 2024-09-12 | End: 2024-09-12 | Stop reason: HOSPADM

## 2024-09-12 RX ADMIN — ACETAMINOPHEN 975 MG: 325 TABLET ORAL at 00:57

## 2024-09-12 NOTE — ED CARE HANDOFF
Emergency Department Sign Out Note        Sign out and transfer of care from Ave Iraheta PA-C. See Separate Emergency Department note.     The patient, Alena Kulkarni, was evaluated by the previous provider for chest pain and SI.  Medical workup including labs, EKG, and CTA dissection study was negative for acute pathology.  Patient did endorse SI secondary to stressors from his divorce and did state that he wanted to jump out of a window yesterday.      Workup Completed:  Results Reviewed       Procedure Component Value Units Date/Time    HS Troponin I 2hr [[id number]]  (Normal) Collected: 09/11/24 1550    Lab Status: Final result Specimen: Blood from Arm, Right Updated: 09/11/24 1629     hs TnI 2hr 5 ng/L      Delta 2hr hsTnI 1 ng/L     HS Troponin I 4hr [[id number]]     Lab Status: No result Specimen: Blood     TSH [[id number]]  (Normal) Collected: 09/11/24 1445    Lab Status: Final result Specimen: Blood from Arm, Right Updated: 09/11/24 1533     TSH 3RD GENERATON 0.838 uIU/mL     Rapid drug screen, urine [[id number]]  (Normal) Collected: 09/11/24 1445    Lab Status: Final result Specimen: Urine, Clean Catch Updated: 09/11/24 1521     Amph/Meth UR Negative     Barbiturate Ur Negative     Benzodiazepine Urine Negative     Cocaine Urine Negative     Methadone Urine Negative     Opiate Urine Negative     PCP Ur Negative     THC Urine Negative     Oxycodone Urine Negative     Fentanyl Urine Negative     HYDROCODONE URINE Negative    Narrative:      FOR MEDICAL PURPOSES ONLY.   IF CONFIRMATION NEEDED PLEASE CONTACT THE LAB WITHIN 5 DAYS.    Drug Screen Cutoff Levels:  AMPHETAMINE/METHAMPHETAMINES  1000 ng/mL  BARBITURATES     200 ng/mL  BENZODIAZEPINES     200 ng/mL  COCAINE      300 ng/mL  METHADONE      300 ng/mL  OPIATES      300 ng/mL  PHENCYCLIDINE     25 ng/mL  THC       50 ng/mL  OXYCODONE      100 ng/mL  FENTANYL      5 ng/mL  HYDROCODONE     300 ng/mL    Ethanol [[id number]]  (Normal) Collected: 09/11/24  1445    Lab Status: Final result Specimen: Blood from Arm, Right Updated: 09/11/24 1516     Ethanol Lvl <10 mg/dL     B-Type Natriuretic Peptide(BNP) [[id number]]  (Normal) Collected: 09/11/24 1350    Lab Status: Final result Specimen: Blood from Arm, Right Updated: 09/11/24 1504     BNP 34 pg/mL     POCT alcohol breath test [[id number]]  (Normal) Resulted: 09/11/24 1444    Lab Status: Final result Updated: 09/11/24 1445     EXTBreath Alcohol 0.00    HS Troponin 0hr (reflex protocol) [[id number]]  (Normal) Collected: 09/11/24 1350    Lab Status: Final result Specimen: Blood from Arm, Right Updated: 09/11/24 1431     hs TnI 0hr 4 ng/L     Comprehensive metabolic panel [[id number]]  (Abnormal) Collected: 09/11/24 1350    Lab Status: Final result Specimen: Blood from Arm, Right Updated: 09/11/24 1425     Sodium 136 mmol/L      Potassium 3.0 mmol/L      Chloride 98 mmol/L      CO2 28 mmol/L      ANION GAP 10 mmol/L      BUN 6 mg/dL      Creatinine 0.98 mg/dL      Glucose 116 mg/dL      Calcium 9.9 mg/dL      AST 17 U/L      ALT 22 U/L      Alkaline Phosphatase 85 U/L      Total Protein 8.0 g/dL      Albumin 4.9 g/dL      Total Bilirubin 0.40 mg/dL      eGFR 87 ml/min/1.73sq m     Narrative:      National Kidney Disease Foundation guidelines for Chronic Kidney Disease (CKD):     Stage 1 with normal or high GFR (GFR > 90 mL/min/1.73 square meters)    Stage 2 Mild CKD (GFR = 60-89 mL/min/1.73 square meters)    Stage 3A Moderate CKD (GFR = 45-59 mL/min/1.73 square meters)    Stage 3B Moderate CKD (GFR = 30-44 mL/min/1.73 square meters)    Stage 4 Severe CKD (GFR = 15-29 mL/min/1.73 square meters)    Stage 5 End Stage CKD (GFR <15 mL/min/1.73 square meters)  Note: GFR calculation is accurate only with a steady state creatinine    CBC and differential [[id number]] Collected: 09/11/24 1350    Lab Status: Final result Specimen: Blood from Arm, Right Updated: 09/11/24 1409     WBC 9.90 Thousand/uL      RBC 5.36 Million/uL       Hemoglobin 15.3 g/dL      Hematocrit 44.9 %      MCV 84 fL      MCH 28.5 pg      MCHC 34.1 g/dL      RDW 14.2 %      MPV 9.8 fL      Platelets 328 Thousands/uL      nRBC 0 /100 WBCs      Segmented % 64 %      Immature Grans % 1 %      Lymphocytes % 28 %      Monocytes % 5 %      Eosinophils Relative 1 %      Basophils Relative 1 %      Absolute Neutrophils 6.39 Thousands/µL      Absolute Immature Grans 0.05 Thousand/uL      Absolute Lymphocytes 2.78 Thousands/µL      Absolute Monocytes 0.50 Thousand/µL      Eosinophils Absolute 0.12 Thousand/µL      Basophils Absolute 0.06 Thousands/µL             CTA dissection protocol chest/abdomen/pelvis   Final Result by E. Alec Schoenberger, MD (09/11 1622)      No aortic aneurysm, dissection or other acute pathology.      1.7 cm circumscribed nodule in the lingula. No adenopathy Based on current Fleischner Society 2017 Guidelines on incidental pulmonary nodule, either PET/CT scan evaluation, tissue sampling or short term interval followup non-contrast CT followup    (initially in 3 months) may be considered appropriate.      Ill-defined subcapsular foci of enhancement in segment 4 of liver may represent arterioportal shunting but suggest further evaluation with contrast-enhanced MRI.      Incidental thyroid nodule(s) for which nonemergent thyroid ultrasound is recommended.      Other nonemergent incidental findings as above      Study marked in epic notification and follow-up.      Workstation performed: [id number]               ED Course / Workup Pending (followup):  HEART Risk Score      Flowsheet Row Most Recent Value   Heart Score Risk Calculator    History 2 Filed at: 09/11/2024 1633   ECG 0 Filed at: 09/11/2024 1633   Age 1 Filed at: 09/11/2024 1633   Risk Factors 2 Filed at: 09/11/2024 1633   Troponin 0 Filed at: 09/11/2024 1633   HEART Score 5 Filed at: 09/11/2024 1633               Procedures  EKG shows normal sinus rhythm with no acute ischemic changes      Medical  Decision Making  Patient presented with chest pain and SI.  Medical work-up negative.  Plan is for patient to be evaluated by an Indonesian speaking psychiatrist in the morning to determine disposition--discharge, 201, vs 302.  No acute events overnight.  Patient signed out to Dr. Smith.    Amount and/or Complexity of Data Reviewed  Labs: ordered.  Radiology: ordered.    Risk  Prescription drug management.  Decision regarding hospitalization.            Disposition  Final diagnoses:   Suicidal ideations   Problems associated with divorce   Lung nodule - recommend 3 month CT follow up for 1.7cm lesion   Abnormal liver CT   Thyroid nodule   Chest pain     Time reflects when diagnosis was documented in both MDM as applicable and the Disposition within this note       Time User Action Codes Description Comment    9/11/2024  4:30 PM WalkerSilAve Add [R45.851] Suicidal ideations     9/11/2024  4:30 PM Walker, Ave Add [Z63.5] Problems associated with divorce     9/11/2024  4:30 PM Walker, Ave Add [R91.1] Lung nodule     9/11/2024  4:32 PM Walker, Ave Add [R93.2] Abnormal liver CT     9/11/2024  4:32 PM Walker Ave Modify [R91.1] Lung nodule recommend 3 month CT follow up for 1.7cm lesion    9/11/2024  4:32 PM Walker, Ave Add [E04.1] Thyroid nodule     9/11/2024  4:32 PM Walker, Ave Add [R07.9] Chest pain           ED Disposition       ED Disposition   Transfer to Behavioral Health Condition   --    Date/Time   Wed Sep 11, 2024 1641    Comment   Alena Kulkarni should be transferred out to crisis consultation and has been medically cleared.               Follow-up Information    None       Patient's Medications   Discharge Prescriptions    No medications on file     No discharge procedures on file.       ED Provider  Electronically Signed by     Jaymie Valdez PA-C  09/12/24 0803
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
## 2024-09-12 NOTE — ED NOTES
Assumed care for pt. Pt currently sleeping with no s/s of distress observed. Continues on 1:1 for safety.      Ilene Charles RN  09/12/24 9056
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
## 2024-09-13 NOTE — ED NOTES
This writer discussed the patients current presentation and recommended discharge plan with Terry Garza and Holter.  They agree with the patient being discharged at this time.    The patient was Instructed to follow up with their primary care physician, Jayy Miranda MD, [phone].    The patient declined to complete a safety plan. The patient and his family were encouraged to call [facility] Crisis Intervention, [phone], 911, or return to the Emergency Department if the patient's symptoms worsen.   This writer discussed discharge plans with the patient and family, who agree with and understand the discharge plans.

## 2024-09-27 ENCOUNTER — APPOINTMENT (EMERGENCY)
Dept: CT IMAGING | Facility: HOSPITAL | Age: 54
End: 2024-09-27

## 2024-09-27 ENCOUNTER — HOSPITAL ENCOUNTER (EMERGENCY)
Facility: HOSPITAL | Age: 54
Discharge: HOME | End: 2024-09-27
Attending: EMERGENCY MEDICINE | Admitting: EMERGENCY MEDICINE

## 2024-09-27 VITALS
HEART RATE: 70 BPM | OXYGEN SATURATION: 97 % | TEMPERATURE: 98.1 F | SYSTOLIC BLOOD PRESSURE: 127 MMHG | DIASTOLIC BLOOD PRESSURE: 83 MMHG | RESPIRATION RATE: 18 BRPM

## 2024-09-27 DIAGNOSIS — R41.82 ALTERED MENTAL STATUS: Primary | ICD-10-CM

## 2024-09-27 DIAGNOSIS — R51.9 HEADACHE: ICD-10-CM

## 2024-09-27 DIAGNOSIS — R07.9 CHEST PAIN: ICD-10-CM

## 2024-09-27 LAB
2HR DELTA HS TROPONIN: 0 NG/L
ALBUMIN SERPL BCG-MCNC: 4.6 G/DL (ref 3.5–5)
ALP SERPL-CCNC: 89 U/L (ref 34–104)
ALT SERPL W P-5'-P-CCNC: 20 U/L (ref 7–52)
AMPHETAMINES SERPL QL SCN: NEGATIVE
ANION GAP SERPL CALCULATED.3IONS-SCNC: 8 MMOL/L (ref 4–13)
APAP SERPL-MCNC: <2 UG/ML (ref 10–20)
AST SERPL W P-5'-P-CCNC: 19 U/L (ref 13–39)
ATRIAL RATE: 72 BPM
ATRIAL RATE: 74 BPM
BACTERIA UR QL AUTO: ABNORMAL /HPF
BARBITURATES UR QL: NEGATIVE
BASOPHILS # BLD AUTO: 0.06 THOUSANDS/ΜL (ref 0–0.1)
BASOPHILS NFR BLD AUTO: 1 % (ref 0–1)
BENZODIAZ UR QL: NEGATIVE
BILIRUB SERPL-MCNC: 0.41 MG/DL (ref 0.2–1)
BILIRUB UR QL STRIP: NEGATIVE
BUN SERPL-MCNC: 8 MG/DL (ref 5–25)
CALCIUM SERPL-MCNC: 9.7 MG/DL (ref 8.4–10.2)
CARDIAC TROPONIN I PNL SERPL HS: 5 NG/L
CARDIAC TROPONIN I PNL SERPL HS: 5 NG/L
CHLORIDE SERPL-SCNC: 104 MMOL/L (ref 96–108)
CK SERPL-CCNC: 104 U/L (ref 39–308)
CLARITY UR: CLEAR
CO2 SERPL-SCNC: 26 MMOL/L (ref 21–32)
COCAINE UR QL: NEGATIVE
COLOR UR: YELLOW
CREAT SERPL-MCNC: 0.86 MG/DL (ref 0.6–1.3)
EOSINOPHIL # BLD AUTO: 0.27 THOUSAND/ΜL (ref 0–0.61)
EOSINOPHIL NFR BLD AUTO: 3 % (ref 0–6)
ERYTHROCYTE [DISTWIDTH] IN BLOOD BY AUTOMATED COUNT: 14.6 % (ref 11.6–15.1)
ETHANOL SERPL-MCNC: <10 MG/DL
FENTANYL UR QL SCN: NEGATIVE
GFR SERPL CREATININE-BSD FRML MDRD: 99 ML/MIN/1.73SQ M
GLUCOSE SERPL-MCNC: 104 MG/DL (ref 65–140)
GLUCOSE SERPL-MCNC: 93 MG/DL (ref 65–140)
GLUCOSE UR STRIP-MCNC: NEGATIVE MG/DL
HCT VFR BLD AUTO: 44.7 % (ref 36.5–49.3)
HGB BLD-MCNC: 15.1 G/DL (ref 12–17)
HGB UR QL STRIP.AUTO: ABNORMAL
HYDROCODONE UR QL SCN: NEGATIVE
IMM GRANULOCYTES # BLD AUTO: 0.02 THOUSAND/UL (ref 0–0.2)
IMM GRANULOCYTES NFR BLD AUTO: 0 % (ref 0–2)
KETONES UR STRIP-MCNC: NEGATIVE MG/DL
LEUKOCYTE ESTERASE UR QL STRIP: NEGATIVE
LYMPHOCYTES # BLD AUTO: 1.75 THOUSANDS/ΜL (ref 0.6–4.47)
LYMPHOCYTES NFR BLD AUTO: 20 % (ref 14–44)
MCH RBC QN AUTO: 28.2 PG (ref 26.8–34.3)
MCHC RBC AUTO-ENTMCNC: 33.8 G/DL (ref 31.4–37.4)
MCV RBC AUTO: 83 FL (ref 82–98)
METHADONE UR QL: NEGATIVE
MONOCYTES # BLD AUTO: 0.52 THOUSAND/ΜL (ref 0.17–1.22)
MONOCYTES NFR BLD AUTO: 6 % (ref 4–12)
NEUTROPHILS # BLD AUTO: 6.22 THOUSANDS/ΜL (ref 1.85–7.62)
NEUTS SEG NFR BLD AUTO: 70 % (ref 43–75)
NITRITE UR QL STRIP: NEGATIVE
NON-SQ EPI CELLS URNS QL MICRO: ABNORMAL /HPF
NRBC BLD AUTO-RTO: 0 /100 WBCS
OPIATES UR QL SCN: NEGATIVE
OXYCODONE+OXYMORPHONE UR QL SCN: NEGATIVE
P AXIS: 47 DEGREES
P AXIS: 52 DEGREES
PCP UR QL: NEGATIVE
PH UR STRIP.AUTO: 7 [PH] (ref 4.5–8)
PLATELET # BLD AUTO: 288 THOUSANDS/UL (ref 149–390)
PMV BLD AUTO: 10 FL (ref 8.9–12.7)
POTASSIUM SERPL-SCNC: 3.5 MMOL/L (ref 3.5–5.3)
PR INTERVAL: 184 MS
PR INTERVAL: 190 MS
PROT SERPL-MCNC: 7.1 G/DL (ref 6.4–8.4)
PROT UR STRIP-MCNC: NEGATIVE MG/DL
QRS AXIS: -17 DEGREES
QRS AXIS: -8 DEGREES
QRSD INTERVAL: 110 MS
QRSD INTERVAL: 116 MS
QT INTERVAL: 408 MS
QT INTERVAL: 412 MS
QTC INTERVAL: 451 MS
QTC INTERVAL: 452 MS
RBC # BLD AUTO: 5.36 MILLION/UL (ref 3.88–5.62)
RBC #/AREA URNS AUTO: ABNORMAL /HPF
SALICYLATES SERPL-MCNC: <5 MG/DL (ref 3–20)
SODIUM SERPL-SCNC: 138 MMOL/L (ref 135–147)
SP GR UR STRIP.AUTO: 1.01 (ref 1–1.03)
T WAVE AXIS: -4 DEGREES
T WAVE AXIS: 0 DEGREES
THC UR QL: NEGATIVE
TSH SERPL DL<=0.05 MIU/L-ACNC: 0.48 UIU/ML (ref 0.45–4.5)
UROBILINOGEN UR QL STRIP.AUTO: 0.2 E.U./DL
VENTRICULAR RATE: 72 BPM
VENTRICULAR RATE: 74 BPM
WBC # BLD AUTO: 8.84 THOUSAND/UL (ref 4.31–10.16)
WBC #/AREA URNS AUTO: ABNORMAL /HPF

## 2024-09-27 PROCEDURE — 72125 CT NECK SPINE W/O DYE: CPT

## 2024-09-27 PROCEDURE — 80053 COMPREHEN METABOLIC PANEL: CPT

## 2024-09-27 PROCEDURE — 70486 CT MAXILLOFACIAL W/O DYE: CPT

## 2024-09-27 PROCEDURE — 93010 ELECTROCARDIOGRAM REPORT: CPT

## 2024-09-27 PROCEDURE — 84484 ASSAY OF TROPONIN QUANT: CPT

## 2024-09-27 PROCEDURE — 84443 ASSAY THYROID STIM HORMONE: CPT

## 2024-09-27 PROCEDURE — 99285 EMERGENCY DEPT VISIT HI MDM: CPT

## 2024-09-27 PROCEDURE — 82948 REAGENT STRIP/BLOOD GLUCOSE: CPT

## 2024-09-27 PROCEDURE — 82077 ASSAY SPEC XCP UR&BREATH IA: CPT

## 2024-09-27 PROCEDURE — 93005 ELECTROCARDIOGRAM TRACING: CPT

## 2024-09-27 PROCEDURE — 85025 COMPLETE CBC W/AUTO DIFF WBC: CPT

## 2024-09-27 PROCEDURE — 82550 ASSAY OF CK (CPK): CPT

## 2024-09-27 PROCEDURE — 70450 CT HEAD/BRAIN W/O DYE: CPT

## 2024-09-27 PROCEDURE — 96367 TX/PROPH/DG ADDL SEQ IV INF: CPT

## 2024-09-27 PROCEDURE — 80179 DRUG ASSAY SALICYLATE: CPT

## 2024-09-27 PROCEDURE — 81001 URINALYSIS AUTO W/SCOPE: CPT

## 2024-09-27 PROCEDURE — 36415 COLL VENOUS BLD VENIPUNCTURE: CPT

## 2024-09-27 PROCEDURE — 80143 DRUG ASSAY ACETAMINOPHEN: CPT

## 2024-09-27 PROCEDURE — 80307 DRUG TEST PRSMV CHEM ANLYZR: CPT

## 2024-09-27 PROCEDURE — 96365 THER/PROPH/DIAG IV INF INIT: CPT

## 2024-09-27 RX ORDER — ACETAMINOPHEN 325 MG/1
975 TABLET ORAL ONCE
Status: COMPLETED | OUTPATIENT
Start: 2024-09-27 | End: 2024-09-27

## 2024-09-27 RX ORDER — ONDANSETRON 4 MG/1
4 TABLET, ORALLY DISINTEGRATING ORAL ONCE
Status: COMPLETED | OUTPATIENT
Start: 2024-09-27 | End: 2024-09-27

## 2024-09-27 RX ORDER — MAGNESIUM SULFATE HEPTAHYDRATE 40 MG/ML
2 INJECTION, SOLUTION INTRAVENOUS ONCE
Status: COMPLETED | OUTPATIENT
Start: 2024-09-27 | End: 2024-09-27

## 2024-09-27 RX ORDER — IBUPROFEN 600 MG/1
600 TABLET, FILM COATED ORAL ONCE
Status: DISCONTINUED | OUTPATIENT
Start: 2024-09-27 | End: 2024-09-27 | Stop reason: HOSPADM

## 2024-09-27 RX ORDER — KETOROLAC TROMETHAMINE 30 MG/ML
15 INJECTION, SOLUTION INTRAMUSCULAR; INTRAVENOUS ONCE
Status: DISCONTINUED | OUTPATIENT
Start: 2024-09-27 | End: 2024-09-27

## 2024-09-27 RX ORDER — ONDANSETRON 2 MG/ML
4 INJECTION INTRAMUSCULAR; INTRAVENOUS ONCE
Status: DISCONTINUED | OUTPATIENT
Start: 2024-09-27 | End: 2024-09-27

## 2024-09-27 RX ADMIN — ONDANSETRON 4 MG: 4 TABLET, ORALLY DISINTEGRATING ORAL at 15:24

## 2024-09-27 RX ADMIN — MAGNESIUM SULFATE HEPTAHYDRATE 2 G: 40 INJECTION, SOLUTION INTRAVENOUS at 15:15

## 2024-09-27 RX ADMIN — SODIUM CHLORIDE, SODIUM LACTATE, POTASSIUM CHLORIDE, AND CALCIUM CHLORIDE 1000 ML: .6; .31; .03; .02 INJECTION, SOLUTION INTRAVENOUS at 13:20

## 2024-09-27 RX ADMIN — ACETAMINOPHEN 975 MG: 325 TABLET ORAL at 15:23

## 2024-09-27 NOTE — ED PROVIDER NOTES
Final diagnoses:   Altered mental status   Chest pain   Headache     ED Disposition       ED Disposition   Discharge    Condition   Stable    Date/Time   Fri Sep 27, 2024  4:30 PM    Comment   Alena Kulkarni discharge to home/self care.                   Assessment & Plan       Medical Decision Making  DDx including but not limited to: metabolic abnormality, intracranial etiology, cardiac etiology, substance abuse, infectious etiology including UTI    Cmp normal. Ct head normal. Trops remained at 5 which were the same as 9/11. ECGs remained non sichemic and no arrhythmias. UDS was negative, coma panel negative. No signs of UTI    At this time unclear etiology of pt's symptoms and presentation. Concerned that pt may have ingested something for self harm but pt declined SI and was oriented and able to ambulate without difficulty. I did offer admission to the pt but himself and the son declined. Both the pt and son were adamant about not taking drugs or alcohol or any other medications. Son did report that pt has not been sleeping much I did state that pt was prescribed remeron when he was at [facility]. I advised follow up with his PCP and therapist as pt is presenting with symptoms of depression.     I have discussed the plan to discharge pt from ED. The patient was discharged in stable condition.  Patient ambulated off the department.  Extensive return to emergency department precautions were discussed.  Follow up with appropriate providers including primary care physician was discussed.  Patient and/or their  primary decision maker expressed understanding.  Patient remained stable during entire emergency department stay.      Amount and/or Complexity of Data Reviewed  Labs: ordered. Decision-making details documented in ED Course.  Radiology: ordered. Decision-making details documented in ED Course.    Risk  OTC drugs.  Prescription drug management.        ED Course as of 09/27/24 1644   Fri Sep 27, 2024   1322  "WBC: 8.84  No concern for any infection at this time.    1322 POC Glucose: 104  Normal    1333 CT head without contrast  No acute intracranial abnormality.   1333 ETHANOL: <10   1334 CT spine cervical without contrast  No cervical spine fracture or traumatic malalignment.   1344 CT facial bones without contrast  No evidence of acute traumatic injury to the facial bones.     Pansinus mucosal disease.     1449 Discussed results with the pt and his son. So far normal. Pt seems more awake and alert at this time. Talking louder and clearer. Keeping eyes open longer. Pt reporting 10/10 headache. Will treat   1501 I had a discussion with the son regarding my concern for potential ingestion of a medication or drug given his presentation and being seen for SI with plan 2 weeks ago at . Son adamantly denies that is the case. States the pt would never do that. Reports that it was a \"misunderstanding due to language\" last week. Reports that pt is no suicidal. Reports that he is going through marital issues right now.    1516 Pt requesting PO meds   1548 Pt reporting chest pain so ECG was performed. ECG non ischemic. By the time I entered the room pt reported chest pain was gone   1555 Pt is still very sleepy. I discussed admission with the pt and his son. However the pt refuses. Son reports that he normally not this tired. I yet again expressed my concern for ingestion of a medication or drug that is not showing up. Son declines stating his dad would never do that. Pt reports he has not been taking his Remeron that he was prescribed.    1627 Pt able to ambulate without difficulty    1629 Delta 2hr hsTnI: 0       Medications   lactated ringers bolus 1,000 mL (1,000 mL Intravenous Not Given 9/27/24 1523)   ibuprofen (MOTRIN) tablet 600 mg (600 mg Oral Not Given 9/27/24 1524)   lactated ringers bolus 1,000 mL (0 mL Intravenous Stopped 9/27/24 1430)   magnesium sulfate 2 g/50 mL IVPB (premix) 2 g (0 g Intravenous Stopped 9/27/24 " "1558)   acetaminophen (TYLENOL) tablet 975 mg (975 mg Oral Given 9/27/24 1523)   ondansetron (ZOFRAN-ODT) dispersible tablet 4 mg (4 mg Oral Given 9/27/24 1524)       ED Risk Strat Scores                           SBIRT 22yo+      Flowsheet Row Most Recent Value   Initial Alcohol Screen: US AUDIT-C     1. How often do you have a drink containing alcohol? 0 Filed at: 09/27/2024 1324   2. How many drinks containing alcohol do you have on a typical day you are drinking?  0 Filed at: 09/27/2024 1324   3a. Male UNDER 65: How often do you have five or more drinks on one occasion? 0 Filed at: 09/27/2024 1324   Audit-C Score 0 Filed at: 09/27/2024 1324   NURIA: How many times in the past year have you...    Used an illegal drug or used a prescription medication for non-medical reasons? Never Filed at: 09/27/2024 1324                            History of Present Illness       Chief Complaint   Patient presents with    Chest Pain     EMS called to [facility] [employer] and found siting on the ground outside of the car. (+) burping. Pt is noted to have dried blood to nares. Pt states \"stress\" and \"I want to sleep.\" Pt denies SI.       Past Medical History:   Diagnosis Date    CAD (coronary artery disease)     Diabetes mellitus (HCC)     Heart disease     Hypertension     Myocardial infarction (HCC)     Thyroid nodule       No past surgical history on file.   No family history on file.   Social History     Tobacco Use    Smoking status: Every Day    Smokeless tobacco: Never   Vaping Use    Vaping status: Never Used   Substance Use Topics    Alcohol use: Not Currently    Drug use: Never      E-Cigarette/Vaping    E-Cigarette Use Never User       E-Cigarette/Vaping Substances    Nicotine No     THC No     CBD No     Flavoring No     Other No     Unknown No       I have reviewed and agree with the history as documented.     54-year-old male with past medical history of hypertension, CAD s/p multiple stents presents today via EMS.  Per " EMS they were called for an unresponsive patient.  Patient was found sitting outside his car at a [facility]. He was sitting on the ground and had dried blood around both of his nares.  EMS was unable to get an accurate history from the patient.  On arrival patient is alert and cooperative but very slow to respond.  He appears tearful at times.  Very withdrawn.  His son is in the room and helps to translate.  Patient states that he had a headache this morning and had some nausea and vomiting.  States that he developed a nosebleed at [facility]. Reports he initially went to [facility] to get food. Does admit to remembering what happened but unwilling to go into further details.  Reports that he currently has dizziness.  Reports he feels very tired.  Admits to some chest pain earlier, none right now.  Patient denies any alcohol use, drug use or ingestion of any other medications.  Patient denies any suicidal ideations.  Per the son this is not patient's baseline.  States that he was seen at [facility] last week and was ultimately discharged.        Review of Systems   Constitutional:  Negative for chills and fever.   HENT:  Positive for nosebleeds. Negative for congestion and sore throat.    Cardiovascular:  Positive for chest pain.   Gastrointestinal:  Positive for nausea and vomiting.   Neurological:  Positive for dizziness, light-headedness and headaches.   Psychiatric/Behavioral:  Negative for suicidal ideas.    All other systems reviewed and are negative.          Objective       ED Triage Vitals   Temperature Pulse Blood Pressure Respirations SpO2 Patient Position - Orthostatic VS   09/27/24 1423 09/27/24 1216 09/27/24 1224 09/27/24 1216 09/27/24 1216 09/27/24 1216   98.1 °F (36.7 °C) 75 143/93 (!) 24 98 % Lying      Temp Source Heart Rate Source BP Location FiO2 (%) Pain Score    09/27/24 1423 09/27/24 1500 09/27/24 1216 -- 09/27/24 1523    Oral Monitor Right arm  10 - Worst Possible Pain      Vitals      Date and Time Temp  "Pulse SpO2 Resp BP Pain Score FACES Pain Rating User   09/27/24 1600 -- 70 97 % 18 127/83 -- -- NG   09/27/24 1523 -- -- -- -- -- 10 - Worst Possible Pain -- JT   09/27/24 1500 -- 74 96 % 19 137/88 -- -- NG   09/27/24 1423 98.1 °F (36.7 °C) -- -- -- -- -- -- JORGE   09/27/24 1400 -- 77 95 % 22 131/79 -- -- JT   09/27/24 1330 -- 75 95 % 19 151/89 -- -- JT   09/27/24 1224 -- -- -- -- 143/93 -- -- NG   09/27/24 1216 -- 75 98 % 24 -- -- Pt states \"I don't know.\" -- NG            Physical Exam  Vitals and nursing note reviewed.   Constitutional:       General: He is not in acute distress.     Appearance: He is well-developed.      Comments: He presents as extremely tired and provides sparse information. He makes no eye contact and seems withdrawn, anxious and tearful at times.    HENT:      Head: Normocephalic and atraumatic.   Eyes:      Extraocular Movements: Extraocular movements intact.      Conjunctiva/sclera: Conjunctivae normal.   Cardiovascular:      Rate and Rhythm: Normal rate and regular rhythm.      Heart sounds: No murmur heard.  Pulmonary:      Effort: Pulmonary effort is normal. No respiratory distress.      Breath sounds: Normal breath sounds. No decreased breath sounds.   Abdominal:      Palpations: Abdomen is soft.      Tenderness: There is no abdominal tenderness.   Musculoskeletal:         General: No swelling.      Cervical back: Neck supple.   Skin:     General: Skin is warm and dry.      Capillary Refill: Capillary refill takes less than 2 seconds.   Neurological:      Mental Status: He is oriented to person, place, and time.   Psychiatric:         Speech: Speech is delayed.         Behavior: Behavior is slowed and withdrawn.         Thought Content: Thought content does not include homicidal or suicidal ideation.         Results Reviewed       Procedure Component Value Units Date/Time    HS Troponin I 2hr [[id number]]  (Normal) Collected: 09/27/24 1600    Lab Status: Final result Specimen: Blood from " Arm, Right Updated: 09/27/24 1629     hs TnI 2hr 5 ng/L      Delta 2hr hsTnI 0 ng/L     Urine Microscopic [[id number]]  (Abnormal) Collected: 09/27/24 1341    Lab Status: Final result Specimen: Urine, Clean Catch Updated: 09/27/24 1537     RBC, UA 2-4 /hpf      WBC, UA None Seen /hpf      Epithelial Cells None Seen /hpf      Bacteria, UA None Seen /hpf     TSH, 3rd generation with Free T4 reflex [[id number]]  (Normal) Collected: 09/27/24 1304    Lab Status: Final result Specimen: Blood from Arm, Right Updated: 09/27/24 1518     TSH 3RD GENERATON 0.483 uIU/mL     Rapid drug screen, urine [[id number]]  (Normal) Collected: 09/27/24 1330    Lab Status: Final result Specimen: Urine, Clean Catch Updated: 09/27/24 1447     Amph/Meth UR Negative     Barbiturate Ur Negative     Benzodiazepine Urine Negative     Cocaine Urine Negative     Methadone Urine Negative     Opiate Urine Negative     PCP Ur Negative     THC Urine Negative     Oxycodone Urine Negative     Fentanyl Urine Negative     HYDROCODONE URINE Negative    Narrative:      FOR MEDICAL PURPOSES ONLY.   IF CONFIRMATION NEEDED PLEASE CONTACT THE LAB WITHIN 5 DAYS.    Drug Screen Cutoff Levels:  AMPHETAMINE/METHAMPHETAMINES  1000 ng/mL  BARBITURATES     200 ng/mL  BENZODIAZEPINES     200 ng/mL  COCAINE      300 ng/mL  METHADONE      300 ng/mL  OPIATES      300 ng/mL  PHENCYCLIDINE     25 ng/mL  THC       50 ng/mL  OXYCODONE      100 ng/mL  FENTANYL      5 ng/mL  HYDROCODONE     300 ng/mL    Urine Macroscopic, POC [[id number]]  (Abnormal) Collected: 09/27/24 1341    Lab Status: Final result Specimen: Urine Updated: 09/27/24 1343     Color, UA Yellow     Clarity, UA Clear     pH, UA 7.0     Leukocytes, UA Negative     Nitrite, UA Negative     Protein, UA Negative mg/dl      Glucose, UA Negative mg/dl      Ketones, UA Negative mg/dl      Urobilinogen, UA 0.2 E.U./dl      Bilirubin, UA Negative     Occult Blood, UA Trace     Specific Gravity, UA 1.015    Narrative:       CLINITEK RESULT    HS Troponin 0hr (reflex protocol) [[id number]]  (Normal) Collected: 09/27/24 1304    Lab Status: Final result Specimen: Blood from Arm, Right Updated: 09/27/24 1336     hs TnI 0hr 5 ng/L     Ethanol [[id number]]  (Normal) Collected: 09/27/24 1304    Lab Status: Final result Specimen: Blood from Arm, Right Updated: 09/27/24 1332     Ethanol Lvl <10 mg/dL     CK [[id number]]  (Normal) Collected: 09/27/24 1304    Lab Status: Final result Specimen: Blood from Arm, Right Updated: 09/27/24 1332     Total  U/L     Comprehensive metabolic panel [[id number]] Collected: 09/27/24 1304    Lab Status: Final result Specimen: Blood from Hand, Right Updated: 09/27/24 1328     Sodium 138 mmol/L      Potassium 3.5 mmol/L      Chloride 104 mmol/L      CO2 26 mmol/L      ANION GAP 8 mmol/L      BUN 8 mg/dL      Creatinine 0.86 mg/dL      Glucose 93 mg/dL      Calcium 9.7 mg/dL      AST 19 U/L      ALT 20 U/L      Alkaline Phosphatase 89 U/L      Total Protein 7.1 g/dL      Albumin 4.6 g/dL      Total Bilirubin 0.41 mg/dL      eGFR 99 ml/min/1.73sq m     Narrative:      National Kidney Disease Foundation guidelines for Chronic Kidney Disease (CKD):     Stage 1 with normal or high GFR (GFR > 90 mL/min/1.73 square meters)    Stage 2 Mild CKD (GFR = 60-89 mL/min/1.73 square meters)    Stage 3A Moderate CKD (GFR = 45-59 mL/min/1.73 square meters)    Stage 3B Moderate CKD (GFR = 30-44 mL/min/1.73 square meters)    Stage 4 Severe CKD (GFR = 15-29 mL/min/1.73 square meters)    Stage 5 End Stage CKD (GFR <15 mL/min/1.73 square meters)  Note: GFR calculation is accurate only with a steady state creatinine    Salicylate level [[id number]]  (Normal) Collected: 09/27/24 1304    Lab Status: Final result Specimen: Blood from Hand, Right Updated: 09/27/24 1328     Salicylate Lvl <5 mg/dL     Acetaminophen level-If concentration is detectable, please discuss with medical  on call. [[id number]]  (Abnormal) Collected:  09/27/24 1304    Lab Status: Final result Specimen: Blood from Hand, Right Updated: 09/27/24 1328     Acetaminophen Level <2 ug/mL     Fingerstick Glucose (POCT) [[id number]]  (Normal) Collected: 09/27/24 1317    Lab Status: Final result Specimen: Blood Updated: 09/27/24 1318     POC Glucose 104 mg/dl     CBC and differential [[id number]] Collected: 09/27/24 1304    Lab Status: Final result Specimen: Blood from Arm, Right Updated: 09/27/24 1313     WBC 8.84 Thousand/uL      RBC 5.36 Million/uL      Hemoglobin 15.1 g/dL      Hematocrit 44.7 %      MCV 83 fL      MCH 28.2 pg      MCHC 33.8 g/dL      RDW 14.6 %      MPV 10.0 fL      Platelets 288 Thousands/uL      nRBC 0 /100 WBCs      Segmented % 70 %      Immature Grans % 0 %      Lymphocytes % 20 %      Monocytes % 6 %      Eosinophils Relative 3 %      Basophils Relative 1 %      Absolute Neutrophils 6.22 Thousands/µL      Absolute Immature Grans 0.02 Thousand/uL      Absolute Lymphocytes 1.75 Thousands/µL      Absolute Monocytes 0.52 Thousand/µL      Eosinophils Absolute 0.27 Thousand/µL      Basophils Absolute 0.06 Thousands/µL             CT head without contrast   Final Interpretation by Ivan Bush MD (09/27 1329)      No acute intracranial abnormality.                  Workstation performed: [id number]         CT facial bones without contrast   Final Interpretation by Ivan Bush MD (09/27 1334)      No evidence of acute traumatic injury to the facial bones.      Pansinus mucosal disease.               Workstation performed: [id number]         CT spine cervical without contrast   Final Interpretation by Ivan Bush MD (09/27 1331)      No cervical spine fracture or traumatic malalignment.                  Workstation performed: [id number]             ECG 12 Lead Documentation Only    Date/Time: 9/27/2024 12:17 PM    Performed by: Pb Barron PA-C  Authorized by: Pb Barron PA-C    Indications / Diagnosis:  AMS  ECG reviewed  by me, the ED Provider: yes    Patient location:  ED  Previous ECG:     Previous ECG:  Compared to current    Similarity:  No change  Interpretation:     Interpretation: normal    Rate:     ECG rate:  74    ECG rate assessment: normal    Rhythm:     Rhythm: sinus rhythm    Ectopy:     Ectopy: none    QRS:     QRS axis:  Normal    QRS intervals:  Normal  Conduction:     Conduction: normal    ST segments:     ST segments:  Normal  T waves:     T waves: normal    ECG 12 Lead Documentation Only    Date/Time: 9/27/2024 3:49 PM    Performed by: Pb Barron PA-C  Authorized by: Pb Barron PA-C    Indications / Diagnosis:  Pt having chest pain  ECG reviewed by me, the ED Provider: yes    Patient location:  ED  Previous ECG:     Previous ECG:  Compared to current    Similarity:  No change  Interpretation:     Interpretation: normal    Rate:     ECG rate:  72    ECG rate assessment: normal    Rhythm:     Rhythm: sinus rhythm    Ectopy:     Ectopy: none    QRS:     QRS axis:  Normal    QRS intervals:  Normal  Conduction:     Conduction: normal        ED Medication and Procedure Management   Prior to Admission Medications   Prescriptions Last Dose Informant Patient Reported? Taking?   Diclofenac Sodium (VOLTAREN) 1 %   No No   Sig: Apply 2 g topically 4 (four) times a day   amLODIPine (NORVASC) 10 mg tablet   No No   Sig: Take 1 tablet (10 mg total) by mouth daily   atorvastatin (LIPITOR) 20 mg tablet   No No   Sig: Take 1 tablet (20 mg total) by mouth daily   clopidogrel (PLAVIX) 75 mg tablet   No No   Sig: Take 1 tablet (75 mg total) by mouth daily   hydroCHLOROthiazide 12.5 mg tablet   No No   Sig: Take 1 tablet (12.5 mg total) by mouth daily   mirtazapine (REMERON) 7.5 MG tablet   No No   Sig: Take 1 tablet (7.5 mg total) by mouth daily at bedtime   nebivolol (BYSTOLIC) 5 mg tablet   No No   Sig: Take 1 tablet (5 mg total) by mouth daily   valsartan (DIOVAN) 160 mg tablet   No No   Sig: Take 1 tablet  (160 mg total) by mouth daily      Facility-Administered Medications: None     Discharge Medication List as of 9/27/2024  4:30 PM        CONTINUE these medications which have NOT CHANGED    Details   amLODIPine (NORVASC) 10 mg tablet Take 1 tablet (10 mg total) by mouth daily, Starting Wed 5/8/2024, Normal      atorvastatin (LIPITOR) 20 mg tablet Take 1 tablet (20 mg total) by mouth daily, Starting Wed 5/8/2024, Normal      clopidogrel (PLAVIX) 75 mg tablet Take 1 tablet (75 mg total) by mouth daily, Starting Wed 5/8/2024, Normal      Diclofenac Sodium (VOLTAREN) 1 % Apply 2 g topically 4 (four) times a day, Starting Wed 5/8/2024, Normal      hydroCHLOROthiazide 12.5 mg tablet Take 1 tablet (12.5 mg total) by mouth daily, Starting Wed 5/8/2024, Normal      mirtazapine (REMERON) 7.5 MG tablet Take 1 tablet (7.5 mg total) by mouth daily at bedtime, Starting u 9/12/2024, Normal      nebivolol (BYSTOLIC) 5 mg tablet Take 1 tablet (5 mg total) by mouth daily, Starting Wed 5/8/2024, Normal      valsartan (DIOVAN) 160 mg tablet Take 1 tablet (160 mg total) by mouth daily, Starting Wed 5/8/2024, Normal           No discharge procedures on file.  ED SEPSIS DOCUMENTATION   Time reflects when diagnosis was documented in both MDM as applicable and the Disposition within this note       Time User Action Codes Description Comment    9/27/2024  4:27 PM Pb Barron [R41.82] Altered mental status     9/27/2024  4:27 PM Pb Barron Add [R07.9] Chest pain     9/27/2024  4:27 PM Pb Barron Add [R51.9] Headache                  Pb Barron PA-C  09/27/24 [id number]

## 2024-09-27 NOTE — ED NOTES
Pt's face washed from dried blood. Pt ambulates in ED without incident.      Kristal Gruber RN  09/27/24 7542

## 2024-09-27 NOTE — ED NOTES
Jovany attempted to collect second troponin blood draw but was unsuccessful. Pt complaining about being stuck multiple times and the pain. RN and ED provider made aware.      Marta Duffy  09/27/24 3105

## 2025-05-19 ENCOUNTER — TELEPHONE (OUTPATIENT)
Dept: FAMILY MEDICINE CLINIC | Facility: CLINIC | Age: 55
End: 2025-05-19

## 2025-05-19 NOTE — TELEPHONE ENCOUNTER
Called and spoke with pt letting him know its been over a year since he's been seen. Pt states that he is in the process of getting insurance and as soon as he gets his card he will call to schedule an appointment.